# Patient Record
(demographics unavailable — no encounter records)

---

## 2024-12-02 NOTE — PHYSICAL EXAM
[Well-appearing] : well-appearing [Normocephalic] : normocephalic [No dysmorphic facial features] : no dysmorphic facial features [No deformities] : no deformities [Alert] : alert [Pupils reactive to light and accommodation] : pupils reactive to light and accommodation [Full extraocular movements] : full extraocular movements [Saccadic and smooth pursuits intact] : saccadic and smooth pursuits intact [Normal facial sensation to light touch] : normal facial sensation to light touch [No facial asymmetry or weakness] : no facial asymmetry or weakness [Gross hearing intact] : gross hearing intact [Equal palate elevation] : equal palate elevation [Normal tongue movement] : normal tongue movement [Midline tongue, no fasciculations] : midline tongue, no fasciculations [Normal axial and appendicular muscle tone] : normal axial and appendicular muscle tone [Gets up on table without difficulty] : gets up on table without difficulty [No abnormal involuntary movements] : no abnormal involuntary movements [5/5 strength in proximal and distal muscles of arms and legs] : 5/5 strength in proximal and distal muscles of arms and legs [Walks and runs well] : walks and runs well [Able to do deep knee bend] : able to do deep knee bend [Able to walk on toes] : able to walk on toes [2+ biceps] : 2+ biceps [Triceps] : triceps [Knee jerks] : knee jerks [Ankle jerks] : ankle jerks [No ankle clonus] : no ankle clonus [Bilaterally] : bilaterally [Good walking balance] : good walking balance [de-identified] : mostly unintelligible speech, able to speak a few words., intermittently follows commands.   [de-identified] : localizes touch/tickle [de-identified] : no dysmetria in reaching for objects

## 2024-12-02 NOTE — HISTORY OF PRESENT ILLNESS
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   Interval history: Patient continues to have monthly seizures of typical semiology (staring off, occasional twitching of upper extremities). Recently had a cluster 2wks prior requiring rescue medication, after which they were told to increase Oxcarb to 5mL BID which made him excessively hyperactive so parents went back down to 4mL BID. He is being evaluated at school for ST/PT/OT needs.   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   MRI: Wnl  Genetics: wnl.

## 2024-12-02 NOTE — PLAN
[FreeTextEntry1] : PLAN: - Start VPA 125mg BID x1wk, increase to 125/250mg after (25mg/kg/day) - Start Levocarnitine 400mg (4mL) BID  - Continue Oxcarb 4mL BID (32mg/kg/day) x1 week then decrease to 2mL BID x1wk, then off.  - Diastat 7.5mg prn for seizures longer than 3mins - RTC in 3 months.

## 2024-12-02 NOTE — END OF VISIT
[] : Resident [FreeTextEntry3] :  time spent does not include teaching [Time Spent: ___ minutes] : I have spent [unfilled] minutes of time on the encounter which excludes teaching and separately reported services.

## 2024-12-02 NOTE — CONSULT LETTER
[Dear  ___] : Dear  [unfilled], [Consult Letter:] : I had the pleasure of evaluating your patient, [unfilled]. [Please see my note below.] : Please see my note below. [Consult Closing:] : Thank you very much for allowing me to participate in the care of this patient.  If you have any questions, please do not hesitate to contact me. [Sincerely,] : Sincerely, [FreeTextEntry3] : Laurence Coates MD PGY- 4 Child Neurology [facility]  Lyric Salinas MD Director, Pediatric Epilepsy [facility] , Pediatric Neurology Residency  [facility] at [facility] [address] [place] [address] Phone: [phone] Fax: [phone]

## 2024-12-02 NOTE — ASSESSMENT
[FreeTextEntry1] : 3y M w/ epilepsy (suspected focal), developmental delays presenting for follow up visit. Patient clinically stable w/ nonfocal exam. He continues to have breakthrough seizures frequently despite being at a therapeutic dose for Oxcarbazepine and has not been able to tolerate further increasing due to behavioral side effects. Given this will switch to VPA today and monitor. Suspect this may also be better cover his type of epilepsy which on most recent EEG is more bifrontal/generalized appearing than before. Will also start levocarnitine and wean oxcarbazepine. Seizure precautions dicussed. RTC in 3 months.

## 2025-02-13 NOTE — PHYSICAL EXAM
[Clear Rhinorrhea] : clear rhinorrhea [Clear to Auscultation Bilaterally] : clear to auscultation bilaterally [NL] : warm, clear [FreeTextEntry7] : dry cough, chest clear, no adv sounds, no wheezes, no distress

## 2025-02-13 NOTE — DISCUSSION/SUMMARY
[FreeTextEntry1] : Prob. viral illness.  Hygiene stressed. No school until diarrhea better and cough improving. Can try claritin to dry him a bit.  Watch diet and careful handwashing. Discuss if not better over weekend.   Sent in saline for cough.

## 2025-02-13 NOTE — HISTORY OF PRESENT ILLNESS
[EENT/Resp Symptoms] : EENT/RESPIRATORY SYMPTOMS [GI Symptoms] : GI SYMPTOMS [de-identified] : Loose stools for a few days and persistent cough, 4 stools 2 days ago, yesterday had 2.  No fever. Not acting ill.

## 2025-03-10 NOTE — PHYSICAL EXAM
[Well-appearing] : well-appearing [Normocephalic] : normocephalic [No dysmorphic facial features] : no dysmorphic facial features [No deformities] : no deformities [Alert] : alert [Pupils reactive to light and accommodation] : pupils reactive to light and accommodation [Full extraocular movements] : full extraocular movements [Saccadic and smooth pursuits intact] : saccadic and smooth pursuits intact [Normal facial sensation to light touch] : normal facial sensation to light touch [No facial asymmetry or weakness] : no facial asymmetry or weakness [Gross hearing intact] : gross hearing intact [Equal palate elevation] : equal palate elevation [Normal tongue movement] : normal tongue movement [Midline tongue, no fasciculations] : midline tongue, no fasciculations [L handed] : L handed [Normal axial and appendicular muscle tone] : normal axial and appendicular muscle tone [Gets up on table without difficulty] : gets up on table without difficulty [No abnormal involuntary movements] : no abnormal involuntary movements [5/5 strength in proximal and distal muscles of arms and legs] : 5/5 strength in proximal and distal muscles of arms and legs [Walks and runs well] : walks and runs well [Able to do deep knee bend] : able to do deep knee bend [Able to walk on toes] : able to walk on toes [2+ biceps] : 2+ biceps [Triceps] : triceps [Knee jerks] : knee jerks [Ankle jerks] : ankle jerks [No ankle clonus] : no ankle clonus [Bilaterally] : bilaterally [Good walking balance] : good walking balance [de-identified] : mostly unintelligible speech, able to speak a few words., intermittently follows commands.   [de-identified] : localizes touch/tickle [de-identified] : no dysmetria in reaching for objects

## 2025-03-10 NOTE — HISTORY OF PRESENT ILLNESS
[Parents] : parents [Normal] : Normal [Brushing teeth] : Brushing teeth [Yes] : Patient goes to dentist yearly [Tap water] : Primary Fluoride Source: Tap water [In Pre-K] : In Pre-K [Appropiate parent-child communication] : Appropriate parent-child communication [Carbon Monoxide Detectors] : Carbon monoxide detectors [Smoke Detectors] : Smoke detectors [Supervised outdoor play] : Supervised outdoor play [Exposure to electronic nicotine delivery system] : No exposure to electronic nicotine delivery system [Up to date] : Up to date [FreeTextEntry7] : Doing well- switched to valproic acid, levocarnitine; doing better in school [de-identified] : signficant eczema [de-identified] : eats well [FreeTextEntry8] : not trained - discussed in detail [FreeTextEntry9] : doing better, SEIT, ST, OT,  [NO] : No

## 2025-03-10 NOTE — PLAN
[FreeTextEntry1] : PLAN: - /250mg (25mg/kg/day) - Start Levocarnitine 400mg (4mL) BID. - CBC, CMP, VPA level, Vit. D level - Diastat 7.5mg prn for seizures longer than 3mins - RTC in 3 months.

## 2025-03-10 NOTE — CONSULT LETTER
[Dear  ___] : Dear  [unfilled], [Consult Letter:] : I had the pleasure of evaluating your patient, [unfilled]. [Please see my note below.] : Please see my note below. [Consult Closing:] : Thank you very much for allowing me to participate in the care of this patient.  If you have any questions, please do not hesitate to contact me. [Sincerely,] : Sincerely, [FreeTextEntry3] : Laurence Coates MD PGY- 4 Child Neurology [facility]  Rito Lopes Attending Pediatric Neurologist/Epileptologist [facility]  of Pediatrics [facility] at [facility]  [address] [place] [id number] Phone: [phone] Fax: [phone]

## 2025-03-10 NOTE — END OF VISIT
[] : Resident [Time Spent: ___ minutes] : I have spent [unfilled] minutes of time on the encounter which excludes teaching and separately reported services. [FreeTextEntry3] :  time spent does not include teaching

## 2025-03-10 NOTE — CONSULT LETTER
[Dear  ___] : Dear  [unfilled], [Consult Letter:] : I had the pleasure of evaluating your patient, [unfilled]. [Please see my note below.] : Please see my note below. [Consult Closing:] : Thank you very much for allowing me to participate in the care of this patient.  If you have any questions, please do not hesitate to contact me. [Sincerely,] : Sincerely, [FreeTextEntry3] : Laurence Coates MD PGY- 4 Child Neurology [facility]  Rito Lopes Attending Pediatric Neurologist/Epileptologist [facility]  of Pediatrics [facility] at [facility]  [address] [place] [id number] Phone: [phone] Fax: [phone]

## 2025-03-10 NOTE — PHYSICAL EXAM
[Alert] : alert [No Acute Distress] : no acute distress [Playful] : playful [Normocephalic] : normocephalic [Conjunctivae with no discharge] : conjunctivae with no discharge [PERRL] : PERRL [EOMI Bilateral] : EOMI bilateral [Auricles Well Formed] : auricles well formed [Clear Tympanic membranes with present light reflex and bony landmarks] : clear tympanic membranes with present light reflex and bony landmarks [No Discharge] : no discharge [Nares Patent] : nares patent [Pink Nasal Mucosa] : pink nasal mucosa [Palate Intact] : palate intact [Uvula Midline] : uvula midline [Nonerythematous Oropharynx] : nonerythematous oropharynx [No Caries] : no caries [Trachea Midline] : trachea midline [Supple, full passive range of motion] : supple, full passive range of motion [No Palpable Masses] : no palpable masses [Symmetric Chest Rise] : symmetric chest rise [Clear to Auscultation Bilaterally] : clear to auscultation bilaterally [Normoactive Precordium] : normoactive precordium [Regular Rate and Rhythm] : regular rate and rhythm [Normal S1, S2 present] : normal S1, S2 present [No Murmurs] : no murmurs [Soft] : soft [NonTender] : non tender [Non Distended] : non distended [Normoactive Bowel Sounds] : normoactive bowel sounds [No Hepatomegaly] : no hepatomegaly [No Splenomegaly] : no splenomegaly [Tc 1] : Tc 1 [Circumcised] : circumcised [Central Urethral Opening] : central urethral opening [Testicles Descended Bilaterally] : testicles descended bilaterally [Patent] : patent [Normally Placed] : normally placed [No Abnormal Lymph Nodes Palpated] : no abnormal lymph nodes palpated [Symmetric Buttocks Creases] : symmetric buttocks creases [Symmetric Hip Rotation] : symmetric hip rotation [No Gait Asymmetry] : no gait asymmetry [No pain or deformities with palpation of bone, muscles, joints] : no pain or deformities with palpation of bone, muscles, joints [Normal Muscle Tone] : normal muscle tone [No Spinal Dimple] : no spinal dimple [NoTuft of Hair] : no tuft of hair [Straight] : straight [+2 Patella DTR] : +2 patella DTR [Cranial Nerves Grossly Intact] : cranial nerves grossly intact [No Rash or Lesions] : no rash or lesions [de-identified] : skin tag at 9 0clock

## 2025-03-10 NOTE — QUALITY MEASURES

## 2025-03-10 NOTE — PHYSICAL EXAM
[Well-appearing] : well-appearing [Normocephalic] : normocephalic [No dysmorphic facial features] : no dysmorphic facial features [No deformities] : no deformities [Alert] : alert [Pupils reactive to light and accommodation] : pupils reactive to light and accommodation [Full extraocular movements] : full extraocular movements [Saccadic and smooth pursuits intact] : saccadic and smooth pursuits intact [Normal facial sensation to light touch] : normal facial sensation to light touch [No facial asymmetry or weakness] : no facial asymmetry or weakness [Gross hearing intact] : gross hearing intact [Equal palate elevation] : equal palate elevation [Normal tongue movement] : normal tongue movement [Midline tongue, no fasciculations] : midline tongue, no fasciculations [L handed] : L handed [Normal axial and appendicular muscle tone] : normal axial and appendicular muscle tone [Gets up on table without difficulty] : gets up on table without difficulty [No abnormal involuntary movements] : no abnormal involuntary movements [5/5 strength in proximal and distal muscles of arms and legs] : 5/5 strength in proximal and distal muscles of arms and legs [Walks and runs well] : walks and runs well [Able to do deep knee bend] : able to do deep knee bend [Able to walk on toes] : able to walk on toes [2+ biceps] : 2+ biceps [Triceps] : triceps [Knee jerks] : knee jerks [Ankle jerks] : ankle jerks [No ankle clonus] : no ankle clonus [Bilaterally] : bilaterally [Good walking balance] : good walking balance [de-identified] : mostly unintelligible speech, able to speak a few words., intermittently follows commands.   [de-identified] : localizes touch/tickle [de-identified] : no dysmetria in reaching for objects

## 2025-03-10 NOTE — DISCUSSION/SUMMARY
[FreeTextEntry1] : Continue balanced diet with all food groups. Brush teeth twice a day with toothbrush. Recommend visit to dentist. As per car seat 's guidelines, use forward-facing booster seat until child reaches highest weight/height for seat. Child needs to ride in a belt-positioning booster seat until  4 feet 9 inches has been reached and are between 8 and 12 years of age.  Put child to sleep in own bed. Help child to maintain consistent daily routines and sleep schedule. Pre-K discussed. Ensure home is safe. Teach child about personal safety. Use consistent, positive discipline. Read aloud to child. Limit screen time to no more than 2 hours per day. discussed toileting at length  20 minutes in discussion and management of problem and excluding any time spent in separate reportable services and/or teaching. blood work mmr/varivax; dtap/ipv

## 2025-03-10 NOTE — HISTORY OF PRESENT ILLNESS
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  Interval history:  Patient seizure frequency improved from prior, no more clusters, possibly having them every month to few months. Tolerating VPA well. Receiving ST/PT/OT in school w/ improvement in speech.    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   MRI: Wnl  Genetics: wnl.

## 2025-05-14 NOTE — PHYSICAL EXAM
[Conjuctival Injection] : conjunctival injection [Mucoid Discharge] : mucoid discharge [NL] : soft, nontender, nondistended, normal bowel sounds, no hepatosplenomegaly [FreeTextEntry7] : mild wheezing

## 2025-06-26 NOTE — PHYSICAL EXAM
[Well-appearing] : well-appearing [Normocephalic] : normocephalic [No dysmorphic facial features] : no dysmorphic facial features [No deformities] : no deformities [Alert] : alert [Pupils reactive to light and accommodation] : pupils reactive to light and accommodation [Full extraocular movements] : full extraocular movements [Saccadic and smooth pursuits intact] : saccadic and smooth pursuits intact [Normal facial sensation to light touch] : normal facial sensation to light touch [No facial asymmetry or weakness] : no facial asymmetry or weakness [Gross hearing intact] : gross hearing intact [Equal palate elevation] : equal palate elevation [Normal tongue movement] : normal tongue movement [Midline tongue, no fasciculations] : midline tongue, no fasciculations [L handed] : L handed [Normal axial and appendicular muscle tone] : normal axial and appendicular muscle tone [Gets up on table without difficulty] : gets up on table without difficulty [No abnormal involuntary movements] : no abnormal involuntary movements [5/5 strength in proximal and distal muscles of arms and legs] : 5/5 strength in proximal and distal muscles of arms and legs [Walks and runs well] : walks and runs well [Able to do deep knee bend] : able to do deep knee bend [Able to walk on toes] : able to walk on toes [2+ biceps] : 2+ biceps [Triceps] : triceps [Knee jerks] : knee jerks [Ankle jerks] : ankle jerks [No ankle clonus] : no ankle clonus [Bilaterally] : bilaterally [Good walking balance] : good walking balance [No ocular abnormalities] : no ocular abnormalities [Neck supple] : neck supple [Lungs clear] : lungs clear [Heart sounds regular in rate and rhythm] : heart sounds regular in rate and rhythm [Soft] : soft [No abnormal neurocutaneous stigmata or skin lesions] : no abnormal neurocutaneous stigmata or skin lesions [Well related, good eye contact] : well related, good eye contact [Conversant] : conversant [Follows instructions well] : follows instructions well [No nystagmus] : no nystagmus [de-identified] : No tenderness to palpation.  [de-identified] :  Occasional unintelligible speech, otherwise soft-spoken. [de-identified] : localizes touch/tickle [de-identified] : no dysmetria in reaching for objects

## 2025-06-26 NOTE — ASSESSMENT
[FreeTextEntry1] : 4y M w/ multifocal epilepsy, developmental delay presenting for follow up visit. Patient clinically stable w/ nonfocal exam. Has been stable on Valproic Acid since dose increased to 250mg BID (30mg/kg/day) close to two months ago.  Will send blood work today including CBC, CMP, Vitamin D and VPA level and keep dose as is. Seizure precautions discussed. RTC in 3 months.

## 2025-06-26 NOTE — PLAN
Walked into patients room around 2000 to give night time medications. Patient was confused, having jerking like motions and his arms and legs were flaccid. Patient was responding to pain. Vitals signs obtained and BP was noted to be 198/89. ..increased from 140/73 about 20 minutes prior. Blood glucose 94. Patient having difficulty with secretions so he was suctioned out. Patient the with eyes still opened would not respond. Patient was sternal rubbed with no response. Stroke alert called. Patient was taken to CT, labs drawn and Dr. Natalie Holloway notified. Patient returned to baseline before taking to CT. Neurology paged and explained situation and negative CT results. No orders received at this time. [FreeTextEntry1] : PLAN: - VPA 250mg BID (30mg/kg/day) - Continue Levocarnitine 400mg (4mL) BID. - Send CBC, CMP, VPA level, Vit. D level - Diastat 7.5mg prn for seizures longer than 3mins - RTC in 3 months.

## 2025-06-26 NOTE — PLAN
[FreeTextEntry1] : PLAN: - VPA 250mg BID (30mg/kg/day) - Continue Levocarnitine 400mg (4mL) BID. - Send CBC, CMP, VPA level, Vit. D level - Diastat 7.5mg prn for seizures longer than 3mins - RTC in 3 months.

## 2025-06-26 NOTE — PHYSICAL EXAM
[Well-appearing] : well-appearing [Normocephalic] : normocephalic [No dysmorphic facial features] : no dysmorphic facial features [No deformities] : no deformities [Alert] : alert [Pupils reactive to light and accommodation] : pupils reactive to light and accommodation [Full extraocular movements] : full extraocular movements [Saccadic and smooth pursuits intact] : saccadic and smooth pursuits intact [Normal facial sensation to light touch] : normal facial sensation to light touch [No facial asymmetry or weakness] : no facial asymmetry or weakness [Gross hearing intact] : gross hearing intact [Equal palate elevation] : equal palate elevation [Normal tongue movement] : normal tongue movement [Midline tongue, no fasciculations] : midline tongue, no fasciculations [L handed] : L handed [Normal axial and appendicular muscle tone] : normal axial and appendicular muscle tone [Gets up on table without difficulty] : gets up on table without difficulty [No abnormal involuntary movements] : no abnormal involuntary movements [5/5 strength in proximal and distal muscles of arms and legs] : 5/5 strength in proximal and distal muscles of arms and legs [Walks and runs well] : walks and runs well [Able to do deep knee bend] : able to do deep knee bend [Able to walk on toes] : able to walk on toes [2+ biceps] : 2+ biceps [Triceps] : triceps [Knee jerks] : knee jerks [Ankle jerks] : ankle jerks [No ankle clonus] : no ankle clonus [Bilaterally] : bilaterally [Good walking balance] : good walking balance [No ocular abnormalities] : no ocular abnormalities [Neck supple] : neck supple [Lungs clear] : lungs clear [Heart sounds regular in rate and rhythm] : heart sounds regular in rate and rhythm [Soft] : soft [No abnormal neurocutaneous stigmata or skin lesions] : no abnormal neurocutaneous stigmata or skin lesions [Well related, good eye contact] : well related, good eye contact [Conversant] : conversant [Follows instructions well] : follows instructions well [No nystagmus] : no nystagmus [de-identified] : No tenderness to palpation.  [de-identified] :  Occasional unintelligible speech, otherwise soft-spoken. [de-identified] : localizes touch/tickle [de-identified] : no dysmetria in reaching for objects

## 2025-06-26 NOTE — CONSULT LETTER
[Dear  ___] : Dear  [unfilled], [Consult Letter:] : I had the pleasure of evaluating your patient, [unfilled]. [Please see my note below.] : Please see my note below. [Consult Closing:] : Thank you very much for allowing me to participate in the care of this patient.  If you have any questions, please do not hesitate to contact me. [Sincerely,] : Sincerely, [FreeTextEntry3] : Bryon Madera Caro DO  PGY- 4 Child Neurology [facility]  Dr. Rossi Huntley M.D. Attending Pediatric Neurologist/Epileptologist [facility] at [facility]  [address] [place] [id number] Phone: [phone] Fax: [phone]

## 2025-06-26 NOTE — DEVELOPMENTAL MILESTONES
[Dresses self, no help] : dresses self, no help [Imaginative play] : imaginative play [Interacts with peers] : interacts with peers [Uses 3 objects] : uses 3 objects [Knows first & last name, age, gender] : knows first & last name, age, gender [Understandable speech 100% of time] : understandable speech 100% of time [Knows 4 colors] : knows 4 colors [Names 4 colors] : names 4 colors [Hops on one foot] : hops on one foot [Brushes teeth, no help] : does not brush teeth, no help [Draws person with 3 parts] : does not draw person with 3 parts [Copies a cross] : does not copy a cross [FreeTextEntry3] : Receiving speech therapy and OT daily at school.

## 2025-06-26 NOTE — HISTORY OF PRESENT ILLNESS
[Sleeps at: ____] : On weekdays, sleeps at [unfilled] [Wakes up at: ____] : wakes up at [unfilled] [FreeTextEntry1] : 4y left handed boy w/ multifocal epilepsy, developmental delay presenting for follow up, last seen in clinic March 2025.  Interval history:  Patient had breakthrough seizure activity while at school 1.5 months ago prompting administration of Diastat and presentation to [place] ED. Patient was able to return to baseline without any complications, father reports that blood work was done in the ED but he does not have records available. VPA dose was increased to 250mg BID (30mg/kg/day). Father reports 1-2 weeks after that seizure Giulianolyn had a URI, was COVID and flu negative, no further breakthrough seizure activity. Receiving ST/PT/OT in school w/ improvement in speech. Family has plans to travel to [place] this summer.   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  MRI: Wnl  Genetics: wnl.

## 2025-06-26 NOTE — CONSULT LETTER
[Dear  ___] : Dear  [unfilled], [Consult Letter:] : I had the pleasure of evaluating your patient, [unfilled]. [Please see my note below.] : Please see my note below. [Consult Closing:] : Thank you very much for allowing me to participate in the care of this patient.  If you have any questions, please do not hesitate to contact me. [Sincerely,] : Sincerely, [FreeTextEntry3] : Bryon Madera Caro DO  PGY- 4 Child Neurology [facility]  Dr. Rossi Huntley M.D. Attending Pediatric Neurologist/Epileptologist [facility] at \[facility] [place] [facility]""/[facility]  [address] [place] [id number] Phone: [phone] Fax: [phone]

## 2025-06-26 NOTE — HISTORY OF PRESENT ILLNESS
[Sleeps at: ____] : On weekdays, sleeps at [unfilled] [Wakes up at: ____] : wakes up at [unfilled] [FreeTextEntry1] : 4y left handed boy w/ multifocal epilepsy, developmental delay presenting for follow up, last seen in clinic March 2025.  Interval history:  Patient had breakthrough seizure activity while at school 1.5 months ago prompting administration of Diastat and presentation to [place] ED. Patient was able to return to baseline without any complications, father reports that blood work was done in the ED but he does not have records available. VPA dose was increased to 250mg BID (30mg/kg/day). Father reports 1-2 weeks after that seizure Giulianolyn had a URI, was COVID and flu negative, no further breakthrough seizure activity. Receiving ST/PT/OT in school w/ improvement in speech. Family has plans to travel to [place] this summer.   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  MRI: Wnl  Genetics: wnl.